# Patient Record
Sex: FEMALE | Race: OTHER | HISPANIC OR LATINO | URBAN - METROPOLITAN AREA
[De-identification: names, ages, dates, MRNs, and addresses within clinical notes are randomized per-mention and may not be internally consistent; named-entity substitution may affect disease eponyms.]

---

## 2017-12-17 ENCOUNTER — EMERGENCY (EMERGENCY)
Facility: HOSPITAL | Age: 32
LOS: 1 days | Discharge: HOME | End: 2017-12-17
Attending: STUDENT IN AN ORGANIZED HEALTH CARE EDUCATION/TRAINING PROGRAM
Payer: SELF-PAY

## 2017-12-17 VITALS — HEIGHT: 63 IN | WEIGHT: 164.91 LBS

## 2017-12-17 LAB
ALBUMIN SERPL ELPH-MCNC: 4.3 G/DL — SIGNIFICANT CHANGE UP (ref 3.3–5.2)
ALP SERPL-CCNC: 46 U/L — SIGNIFICANT CHANGE UP (ref 40–120)
ALT FLD-CCNC: 14 U/L — SIGNIFICANT CHANGE UP
AMPHET UR-MCNC: NEGATIVE — SIGNIFICANT CHANGE UP
ANION GAP SERPL CALC-SCNC: 15 MMOL/L — SIGNIFICANT CHANGE UP (ref 5–17)
AST SERPL-CCNC: 18 U/L — SIGNIFICANT CHANGE UP
BARBITURATES UR SCN-MCNC: NEGATIVE — SIGNIFICANT CHANGE UP
BENZODIAZ UR-MCNC: NEGATIVE — SIGNIFICANT CHANGE UP
BILIRUB DIRECT SERPL-MCNC: 0.1 MG/DL — SIGNIFICANT CHANGE UP (ref 0–0.3)
BILIRUB INDIRECT FLD-MCNC: 0.3 MG/DL — SIGNIFICANT CHANGE UP (ref 0.2–1)
BILIRUB SERPL-MCNC: 0.4 MG/DL — SIGNIFICANT CHANGE UP (ref 0.4–2)
BUN SERPL-MCNC: 9 MG/DL — SIGNIFICANT CHANGE UP (ref 8–20)
CALCIUM SERPL-MCNC: 9.4 MG/DL — SIGNIFICANT CHANGE UP (ref 8.6–10.2)
CHLORIDE SERPL-SCNC: 102 MMOL/L — SIGNIFICANT CHANGE UP (ref 98–107)
CO2 SERPL-SCNC: 21 MMOL/L — LOW (ref 22–29)
COCAINE METAB.OTHER UR-MCNC: NEGATIVE — SIGNIFICANT CHANGE UP
CREAT SERPL-MCNC: 0.63 MG/DL — SIGNIFICANT CHANGE UP (ref 0.5–1.3)
ETHANOL SERPL-MCNC: <10 MG/DL — SIGNIFICANT CHANGE UP
GLUCOSE SERPL-MCNC: 156 MG/DL — HIGH (ref 70–115)
HCG SERPL-ACNC: <2 MIU/ML — SIGNIFICANT CHANGE UP
HCT VFR BLD CALC: 42.3 % — SIGNIFICANT CHANGE UP (ref 37–47)
HGB BLD-MCNC: 14.5 G/DL — SIGNIFICANT CHANGE UP (ref 12–16)
MCHC RBC-ENTMCNC: 28.9 PG — SIGNIFICANT CHANGE UP (ref 27–31)
MCHC RBC-ENTMCNC: 34.3 G/DL — SIGNIFICANT CHANGE UP (ref 32–36)
MCV RBC AUTO: 84.3 FL — SIGNIFICANT CHANGE UP (ref 81–99)
METHADONE UR-MCNC: NEGATIVE — SIGNIFICANT CHANGE UP
OPIATES UR-MCNC: NEGATIVE — SIGNIFICANT CHANGE UP
PCP SPEC-MCNC: SIGNIFICANT CHANGE UP
PCP UR-MCNC: NEGATIVE — SIGNIFICANT CHANGE UP
PLATELET # BLD AUTO: 311 K/UL — SIGNIFICANT CHANGE UP (ref 150–400)
POTASSIUM SERPL-MCNC: 3.5 MMOL/L — SIGNIFICANT CHANGE UP (ref 3.5–5.3)
POTASSIUM SERPL-SCNC: 3.5 MMOL/L — SIGNIFICANT CHANGE UP (ref 3.5–5.3)
PROT SERPL-MCNC: 7.7 G/DL — SIGNIFICANT CHANGE UP (ref 6.6–8.7)
RBC # BLD: 5.02 M/UL — SIGNIFICANT CHANGE UP (ref 4.4–5.2)
RBC # FLD: 13.5 % — SIGNIFICANT CHANGE UP (ref 11–15.6)
SODIUM SERPL-SCNC: 138 MMOL/L — SIGNIFICANT CHANGE UP (ref 135–145)
THC UR QL: NEGATIVE — SIGNIFICANT CHANGE UP
WBC # BLD: 20.1 K/UL — HIGH (ref 4.8–10.8)
WBC # FLD AUTO: 20.1 K/UL — HIGH (ref 4.8–10.8)

## 2017-12-17 PROCEDURE — 93010 ELECTROCARDIOGRAM REPORT: CPT

## 2017-12-17 PROCEDURE — 85027 COMPLETE CBC AUTOMATED: CPT

## 2017-12-17 PROCEDURE — 80307 DRUG TEST PRSMV CHEM ANLYZR: CPT

## 2017-12-17 PROCEDURE — 36415 COLL VENOUS BLD VENIPUNCTURE: CPT

## 2017-12-17 PROCEDURE — 99284 EMERGENCY DEPT VISIT MOD MDM: CPT

## 2017-12-17 PROCEDURE — 80076 HEPATIC FUNCTION PANEL: CPT

## 2017-12-17 PROCEDURE — 84702 CHORIONIC GONADOTROPIN TEST: CPT

## 2017-12-17 PROCEDURE — 99285 EMERGENCY DEPT VISIT HI MDM: CPT | Mod: 25

## 2017-12-17 PROCEDURE — 90792 PSYCH DIAG EVAL W/MED SRVCS: CPT | Mod: GT

## 2017-12-17 PROCEDURE — 80048 BASIC METABOLIC PNL TOTAL CA: CPT

## 2017-12-17 PROCEDURE — 93005 ELECTROCARDIOGRAM TRACING: CPT

## 2017-12-17 RX ORDER — ONDANSETRON 8 MG/1
4 TABLET, FILM COATED ORAL ONCE
Qty: 0 | Refills: 0 | Status: COMPLETED | OUTPATIENT
Start: 2017-12-17 | End: 2017-12-17

## 2017-12-17 NOTE — ED ADULT NURSE NOTE - OBJECTIVE STATEMENT
ASsumed pt care at 1645.  Pt awake, alert, refusing to speak at this time.  As per boyfriend at bedside, pt was found in bathroom crying with 10 empty packets of dayquil.  As per boyfriend, pt denies taking medication.  Pt with no signs of distress at this time but refusing to answer questions.  Moving all extremities well and with purpose.  Respirations even and unlabored.  Boyfriend at bedside.  Pt received in yellow gown with no belongings at bedside.  Boyfriend denies hx of SI or HI for pt.

## 2017-12-17 NOTE — ED PROVIDER NOTE - SHIFT CHANGE DETAILS
AWAITING PSYCH EVAL FOR ALLEGED OVERDOSE OF COLD MEDICATION  FOLLOW UP WITH PSYCH EVALUATION AND DISPO

## 2017-12-17 NOTE — ED PROVIDER NOTE - PROGRESS NOTE DETAILS
MEDICALLY CLEAR , AWAITING PSYCH EVAL Patient stable. As per sign-out patient is awaiting psychiatric consultation for non-toxic ingestion. Patient cleared for discharge by psych.

## 2017-12-17 NOTE — ED ADULT NURSE REASSESSMENT NOTE - COMFORT CARE
ambulated to bathroom/darkened lights/plan of care explained/wait time explained/warm blanket provided

## 2017-12-17 NOTE — ED PROVIDER NOTE - MEDICAL DECISION MAKING DETAILS
POSSIBLE OD ON COLD MEDS - HE SAID/SHE SAID - RELATIONSHIP ISSUES AT HOME. PT DENIES SUICIDALITY. WORK UP ORDERED
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
## 2017-12-18 VITALS
TEMPERATURE: 98 F | RESPIRATION RATE: 15 BRPM | DIASTOLIC BLOOD PRESSURE: 61 MMHG | SYSTOLIC BLOOD PRESSURE: 103 MMHG | HEART RATE: 61 BPM | OXYGEN SATURATION: 100 %

## 2017-12-18 DIAGNOSIS — F43.23 ADJUSTMENT DISORDER WITH MIXED ANXIETY AND DEPRESSED MOOD: ICD-10-CM

## 2017-12-18 DIAGNOSIS — R69 ILLNESS, UNSPECIFIED: ICD-10-CM
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
## 2017-12-18 NOTE — ED BEHAVIORAL HEALTH ASSESSMENT NOTE - SUICIDE PROTECTIVE FACTORS
Identifies reasons for living/Future oriented/Supportive social network or family/Responsibility to family and others/High spirituality
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
## 2017-12-18 NOTE — ED BEHAVIORAL HEALTH ASSESSMENT NOTE - RISK ASSESSMENT
patient low imminent risk given denial of active suicidal ideation or prior suicide attempt, states future oriented/wanting to live for children; denies global insomnia or acute mood/psychotic/anxiety disorder

## 2017-12-18 NOTE — ED BEHAVIORAL HEALTH ASSESSMENT NOTE - HPI (INCLUDE ILLNESS QUALITY, SEVERITY, DURATION, TIMING, CONTEXT, MODIFYING FACTORS, ASSOCIATED SIGNS AND SYMPTOMS)
32 year old  female, domiciled with boyfriend and their 6 month old infant, past medical history of HTN, no substance use history, no formal past psych history or prior reported suicide attempts, no legal history, brought in by EMS due to concern from boyfriend that patient had taking tylenol pills.  Patient states that she was feeling sad today due to ongoing reported relationship stressors and felt "stressed" and "tearful" throughout the day. She comments that she tends to get "emotional" close to her menstrual cycle which is reportedly 2 days away. Patient states she attempted to talk to her boyfriend today about how she feels he is not giving her attention and states that he "did not want to listen to me." She states that she felt sad and felt like she had a cold/sore throat and decided to take 2 Dayquil to try to feel better. Reports she has also been taking other tylenol regimens to cope with her sore throat/cold. She strongly denies suicidal intent, or thoughts of wishing to die. She states that she told her neighbor that she was feeling sad and her neighbor called  and told him that she had taken tylenol pills. Patient reports  came home and called 911 to "get me in trouble because he wants custody." She denies ongoing depressed mood or sleep changes. Denies auditory hallucinations or paranoia. Denies abuse or violence in home or abuse towards baby. Denies inability to care for baby. Patient was well related on exam, and denies safety concerns and denied wanting to stay in hospital for treatment. 32 year old  female, domiciled with boyfriend and their 6 month old infant, past medical history of HTN, no substance use history, no formal past psych history or prior reported suicide attempts, no legal history, brought in by EMS due to concern from boyfriend that patient had taking tylenol pills.  Patient states that she was feeling sad today due to ongoing reported relationship stressors and felt "stressed" and "tearful" throughout the day. She comments that she tends to get "emotional" close to her menstrual cycle which is reportedly 2 days away. Patient states she attempted to talk to her boyfriend today about how she feels he is not giving her attention and states that he "did not want to listen to me." She states that she felt sad and felt like she had a cold/sore throat and decided to take 2 DayQuil to try to feel better. Reports she has also been taking other tylenol regimens to cope with her sore throat/cold. She strongly denies suicidal intent, or thoughts of wishing to die. She states that she told her neighbor that she was feeling sad and her neighbor called  and told him that she had taken tylenol pills. Patient reports  came home and called 911 to "get me in trouble because he wants custody." She denies ongoing depressed mood or sleep changes. Denies auditory hallucinations or paranoia. Denies abuse or violence in home or abuse towards baby. Denies inability to care for baby. Patient was well related on exam, and denies safety concerns and denied wanting to stay in hospital for treatment.    Collateral reports returning home today to find a container of medicine on floor. Collateral reports that patient admitted to taking some pills but would not confirm amount. Collateral denied any suicidal statements at the time of ingestion. Collateral called 911 and EMS transported patient to ED. Patient has not had any past psychiatric visits, hospitalizations, medication trials or visits with a therapist or a counselor. No hx of suicidality, suicidal attempts or self- injurious behavior in the past. Collateral reports patient has been looking more sad lately but has no notable signs or symptoms of depression, corky, and psychosis. Collateral reports patient has been stating that she “is getting old and is fat”. Collateral reports that patient is post-partum 6 months and has two other children in the home, all 3 children are in the care of his sister at this time but otherwise patient has been caring appropriately for children. Collateral denies any history of legals, substance use, and trauma. Collateral denied family history of mental illness, suicide, and substance use. Collateral reports patient is not currently on medication but has a history of high blood pressure. Collateral reports feeling comfortable bringing patient home, stating that he will be with her at all times. Collateral believes patient will utilize outpatient services.
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
## 2017-12-18 NOTE — ED BEHAVIORAL HEALTH ASSESSMENT NOTE - OTHER
stress from relationship; access to pain medications (tylenol) post-partum post-partum, relationship stress no acute safety concerns reported n/a

## 2017-12-18 NOTE — ED BEHAVIORAL HEALTH ASSESSMENT NOTE - DETAILS
+fatigue +sore throat confirmed with family denies that taking tylenol today was suicidal intent; denies prior suicide attempt boyfriend Aware

## 2017-12-18 NOTE — ED BEHAVIORAL HEALTH ASSESSMENT NOTE - SUMMARY
32 year old  female, domiciled with boyfriend and their 6 month old infant, past medical history of HTN, no substance use history, no formal past psych history or prior reported suicide attempts, no legal history, brought in by EMS due to concern from boyfriend that patient had taking tylenol pills.  patient at this time does not meet criteria for major mood, anxiety or psychotic disorder. Appears to be social stress related to post-partum and relationship status. She denies acute safety concerns and reports that her tylenol ingestion was to treat a cold today and not for suicidal intent. No prior suicide attempt. Patient declines offer for voluntary admission, stating she is feeling safe to go home and will pursue outpatient treatment. 32 year old  female, domiciled with boyfriend and their 6 month old infant, past medical history of HTN, no substance use history, no formal past psych history or prior reported suicide attempts, no legal history, brought in by EMS due to concern from boyfriend that patient had taking tylenol pills.  patient at this time does not meet criteria for major mood, anxiety or psychotic disorder. Appears to be social stress related to post-partum and relationship status. She denies acute safety concerns and reports that her tylenol ingestion was to treat a cold today and not for suicidal intent. No prior suicide attempt. Patient declines offer for voluntary admission, stating she is feeling safe to go home and will pursue outpatient treatment. Collateral denies acute safety concern

## 2017-12-18 NOTE — ED BEHAVIORAL HEALTH ASSESSMENT NOTE - REFERRAL / APPOINTMENT DETAILS
provided resources from [facility] and post partum group; patient states she will make appointment

## 2017-12-18 NOTE — ED BEHAVIORAL HEALTH ASSESSMENT NOTE - SAFETY PLAN DETAILS
patient and boyfriend aware to call 911/go to Emergency Department if symptoms worsen or safety concerns; patient educated on following guidelines of over the counter dosing

## 2022-01-18 NOTE — ED ADULT NURSE NOTE - CAS TRG GEN SKIN CONDITION
Warm/Dry Infliximab Counseling:  I discussed with the patient the risks of infliximab including but not limited to myelosuppression, immunosuppression, autoimmune hepatitis, demyelinating diseases, lymphoma, and serious infections.  The patient understands that monitoring is required including a PPD at baseline and must alert us or the primary physician if symptoms of infection or other concerning signs are noted.

## 2022-04-04 NOTE — ED BEHAVIORAL HEALTH ASSESSMENT NOTE - TELEPSYCHIATRY?
Received call from Cyndie at [facility] with [employer]. Subjective: Caller states \"feet have been swollen and reddish for about a month now\"     Current Symptoms: feet swelling    Onset: 1 month ago; worsening    Associated Symptoms: none    Pain Severity:  denies    Temperature: denies    What has been tried: elevating feet, compression stockings did not help      Recommended disposition: See PCP within 3 Days for further evaluation of pedal edema. Care advice provided, patient verbalizes understanding; denies any other questions or concerns; instructed to call back for any new or worsening symptoms. Patient/Caller agrees with recommended disposition; writer provided warm transfer to [facility] for appointment scheduling     Attention Provider: Thank you for allowing me to participate in the care of your patient. The patient was connected to triage in response to information provided to the E[facility]/PSC. Please do not respond through this encounter as the response is not directed to a shared pool.       Reason for Disposition   Swelling of both ankles (i.e., pedal edema)   [1] MILD swelling of both ankles (i.e., pedal edema) AND [2] new-onset or worsening    Protocols used: ANKLE SWELLING-ADULT-AH, LEG SWELLING AND EDEMA-ADULT-AH
Yes